# Patient Record
Sex: FEMALE | Race: WHITE | Employment: UNEMPLOYED | URBAN - METROPOLITAN AREA
[De-identification: names, ages, dates, MRNs, and addresses within clinical notes are randomized per-mention and may not be internally consistent; named-entity substitution may affect disease eponyms.]

---
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
## 2021-04-08 ENCOUNTER — RECORDS - HEALTHEAST (OUTPATIENT)
Dept: LAB | Facility: CLINIC | Age: 21
End: 2021-04-08

## 2021-04-08 LAB
FERRITIN SERPL-MCNC: 88 NG/ML (ref 6–40)
IRON SATN MFR SERPL: 23 % (ref 20–50)
IRON SERPL-MCNC: 65 UG/DL (ref 42–175)
T4 FREE SERPL-MCNC: 0.9 NG/DL (ref 0.7–1.8)
TIBC SERPL-MCNC: 287 UG/DL (ref 313–563)
TRANSFERRIN SERPL-MCNC: 229 MG/DL (ref 212–360)
TSH SERPL DL<=0.005 MIU/L-ACNC: 2.4 UIU/ML (ref 0.3–5)

## 2021-04-09 LAB — 25(OH)D3 SERPL-MCNC: 25.9 NG/ML (ref 30–80)

## 2021-08-27 ENCOUNTER — LAB REQUISITION (OUTPATIENT)
Dept: LAB | Facility: CLINIC | Age: 21
End: 2021-08-27
Payer: COMMERCIAL

## 2021-08-27 DIAGNOSIS — J02.9 ACUTE PHARYNGITIS, UNSPECIFIED: ICD-10-CM

## 2021-08-27 PROCEDURE — 86663 EPSTEIN-BARR ANTIBODY: CPT | Mod: ORL | Performed by: PEDIATRICS

## 2021-08-27 PROCEDURE — 86665 EPSTEIN-BARR CAPSID VCA: CPT | Mod: ORL | Performed by: PEDIATRICS

## 2021-08-27 PROCEDURE — 86645 CMV ANTIBODY IGM: CPT | Mod: ORL | Performed by: PEDIATRICS

## 2021-08-27 PROCEDURE — 86644 CMV ANTIBODY: CPT | Mod: ORL | Performed by: PEDIATRICS

## 2021-08-30 LAB
CMV IGG SERPL IA-ACNC: 5.5 U/ML
CMV IGG SERPL IA-ACNC: ABNORMAL
CMV IGM SERPL IA-ACNC: <8 AU/ML
CMV IGM SERPL IA-ACNC: NEGATIVE
EBV EA-D IGG SER-ACNC: 14.8 U/ML (ref 0–9)
EBV EA-D IGG SER-ACNC: POSITIVE
EBV NA IGG SER IA-ACNC: 34.6 U/ML
EBV NA IGG SER IA-ACNC: POSITIVE [IU]/ML
EBV VCA IGG SER IA-ACNC: 96.1 U/ML
EBV VCA IGG SER IA-ACNC: POSITIVE
EBV VCA IGM SER IA-ACNC: <10 U/ML
EBV VCA IGM SER IA-ACNC: NORMAL

## 2021-09-02 ENCOUNTER — LAB REQUISITION (OUTPATIENT)
Dept: LAB | Facility: CLINIC | Age: 21
End: 2021-09-02
Payer: COMMERCIAL

## 2021-09-02 DIAGNOSIS — Z86.19 PERSONAL HISTORY OF OTHER INFECTIOUS AND PARASITIC DISEASES: ICD-10-CM

## 2021-09-02 LAB
IGA SERPL-MCNC: 208 MG/DL (ref 65–400)
IGG SERPL-MCNC: 1375 MG/DL (ref 700–1700)
IGM SERPL-MCNC: 128 MG/DL (ref 60–280)

## 2021-09-02 PROCEDURE — 36415 COLL VENOUS BLD VENIPUNCTURE: CPT | Mod: ORL | Performed by: PEDIATRICS

## 2021-09-02 PROCEDURE — 86317 IMMUNOASSAY INFECTIOUS AGENT: CPT | Mod: ORL | Performed by: PEDIATRICS

## 2021-09-02 PROCEDURE — 82784 ASSAY IGA/IGD/IGG/IGM EACH: CPT | Mod: ORL | Performed by: PEDIATRICS

## 2021-09-05 LAB
C DIPHTHERIAE IGG SER-ACNC: 0.5 IU/ML
C TETANI TOXOID IGG SERPL IA-ACNC: 1.4 IU/ML
HAEM INFLU B IGG SER-MCNC: 0 UG/ML

## 2021-09-07 LAB
S PN DA SERO 19F IGG SER-MCNC: 29.2 MCG/ML
S PNEUM DA 1 IGG SER-MCNC: 32.9 MCG/ML
S PNEUM DA 10A IGG SER-MCNC: 12.5 MCG/ML
S PNEUM DA 11A IGG SER-MCNC: 1 MCG/ML
S PNEUM DA 12F IGG SER-MCNC: 0.8 MCG/ML
S PNEUM DA 14 IGG SER-MCNC: 11.1 MCG/ML
S PNEUM DA 15B IGG SER-MCNC: 12.3 MCG/ML
S PNEUM DA 17F IGG SER-MCNC: 12.2 MCG/ML
S PNEUM DA 18C IGG SER-MCNC: 0.9 MCG/ML
S PNEUM DA 19A IGG SER-MCNC: 5.2 MCG/ML
S PNEUM DA 2 IGG SER-MCNC: 3.8 MCG/ML
S PNEUM DA 20A IGG SER-MCNC: 3.4 MCG/ML
S PNEUM DA 22F IGG SER-MCNC: 91.8 MCG/ML
S PNEUM DA 23F IGG SER-MCNC: 131.9 MCG/ML
S PNEUM DA 3 IGG SER-MCNC: 2.9 MCG/ML
S PNEUM DA 33F IGG SER-MCNC: 1.9 MCG/ML
S PNEUM DA 4 IGG SER-MCNC: 0.9 MCG/ML
S PNEUM DA 5 IGG SER-MCNC: 3.6 MCG/ML
S PNEUM DA 6B IGG SER-MCNC: 12.6 MCG/ML
S PNEUM DA 7F IGG SER-MCNC: 10.5 MCG/ML
S PNEUM DA 8 IGG SER-MCNC: 3.8 MCG/ML
S PNEUM DA 9N IGG SER-MCNC: NORMAL MCG/ML
S PNEUM DA 9V IGG SER-MCNC: 6.8 MCG/ML

## 2021-10-26 ENCOUNTER — VIRTUAL VISIT (OUTPATIENT)
Dept: INFECTIOUS DISEASES | Facility: CLINIC | Age: 21
End: 2021-10-26
Attending: INTERNAL MEDICINE
Payer: COMMERCIAL

## 2021-10-26 ENCOUNTER — PRE VISIT (OUTPATIENT)
Dept: INFECTIOUS DISEASES | Facility: CLINIC | Age: 21
End: 2021-10-26

## 2021-10-26 DIAGNOSIS — B99.9 RECURRENT INFECTIONS: Primary | ICD-10-CM

## 2021-10-26 PROCEDURE — 99205 OFFICE O/P NEW HI 60 MIN: CPT | Mod: 95 | Performed by: INTERNAL MEDICINE

## 2021-10-26 RX ORDER — ONDANSETRON 4 MG/1
TABLET, ORALLY DISINTEGRATING ORAL
COMMUNITY
Start: 2021-10-14

## 2021-10-26 RX ORDER — ESCITALOPRAM OXALATE 20 MG/1
20 TABLET ORAL
COMMUNITY

## 2021-10-26 RX ORDER — CYCLOSPORINE 0.5 MG/ML
EMULSION OPHTHALMIC
COMMUNITY
Start: 2021-08-18

## 2021-10-26 RX ORDER — ALBUTEROL SULFATE 90 UG/1
AEROSOL, METERED RESPIRATORY (INHALATION)
COMMUNITY

## 2021-10-26 RX ORDER — INHALER, ASSIST DEVICES
SPACER (EA) MISCELLANEOUS
COMMUNITY
Start: 2021-05-13

## 2021-10-26 RX ORDER — FLUTICASONE PROPIONATE 50 MCG
2 SPRAY, SUSPENSION (ML) NASAL
COMMUNITY
Start: 2021-09-30

## 2021-10-26 ASSESSMENT — PAIN SCALES - GENERAL: PAINLEVEL: NO PAIN (0)

## 2021-10-26 NOTE — LETTER
10/26/2021       RE: Kerline Young  [address] [place] [address]     Dear Colleague,    Thank you for referring your patient, Kerline Young, to the [facility] INFECTIOUS DISEASE CLINIC [facility]. Please see a copy of my visit note below.      Kerline Young is a 20 year old woman who is being evaluated via a billable video visit.      ** patient can do Doximity **    How would you like to obtain your AVS? MyChart  If the video visit is dropped, the invitation should be resent by: Text to cell phone: [phone]  Will anyone else be joining your video visit? No  {If patient encounters technical issues they should call [phone] :[phone]}    Reason for visit:   Infectious Disease New Visit, self referred for recurrent infections    HISTORY OF THE PRESENT ILLNESS:    Kerline Young is a 21 yo woman with mild asthma using albuterol PRN and history of Hashimoto's thyroiditis, without ongoing need for thyroid hormone replacement.    Kerline is seen today to review frequent or recurrent infections she has experienced in the last year.    Kerline had COVID in 10/2020.   She had symptoms of fever, sore throat, loss of taste and smell, fatigue.  She vomited, which she nearly always does when she is not feeling well regardless.  Cough developed a few days into the course.  She was able to do OK at home, without needing additional care.  She was feeling much better within 10-14 days, with fatigue being the last thing to resolve.  She is confident that she was back to her baseline by Thanksgiving 2020, and likely before.      She then felt well until Spring of 2021.  She had sinus pressure and was diagnosed with her first ever sinus infection in May 2021.  Then bacterial tonsillitis.  She recalls having a prescription for doxycycline first, then Augmentin.  By June, she had severe dry eye that she need eye drops to treat.  Lately she has not needed eye drops  "and feels the eyes are fine.    In late August 2021, she had sore and swollen throat. She also had new fatigue at that time. 8/26 Group A Strep PCR not detected.  8/27 and 8/28/2021 she was evaluated with CMV and EBV testing. CMV IgM negative, CMV IgG positive, EBV IgM negative, EBV IgGs positive including EBNA.  8/28 CT of the Neck with marked enlargement of lymphoid tissue of Waldeyer's ring and pharyngitis.  She received Decadron and Augmentin, and presumed mononucleosis diagnosis.    9/30/2021 Returned to care with cough, sore throat, nasal congestion.  Was given sinus rinses, intranasal corticosteroid, decongestant.    10/13/2021  Kerline reported recurrent nasal congestion and not feeling well to PCP, and she prescribed Augmentin for 21 days    10/14/2021  Kerline vomited 13 times in 3 hours, so went to the ED.  WBC 21.  ANC 19.   CMP all OK other than very mild elevation of AST to 47 (normal up to 38)  Given anti-emetics and fluids and felt better.  Resumed Augmentin, and this has really helped.  She is now feeling quite a bit better as of today 10/26/2021.      I have reviewed and updated the patient's Past Medical History, Social History, Family History and Medication List.    PMH:    February 2020 diagnosed with pneumonia and felt very sick.  Did not require hospitalization.  Would also get Strep diagnoses a lot as a kid.  Whenever she would get a fever, she would also vomit.  Mother would not say that she had frequent infections outside of what would be expected for a normal childhood.    Family Med Hx:  Brother with history of small and medium cell vasculitis  5 of her 5 paternal aunts have hypothyroidism  Father had \"iritis\" one time that they never identified an etiology      OBJECTIVE:    Current Outpatient Medications   Medication     albuterol (PROAIR HFA/PROVENTIL HFA/VENTOLIN HFA) 108 (90 Base) MCG/ACT inhaler     amoxicillin-clavulanate (AUGMENTIN) 875-125 MG tablet     cycloSPORINE (RESTASIS) 0.05 % " ophthalmic emulsion     escitalopram (LEXAPRO) 20 MG tablet     fluticasone (FLONASE) 50 MCG/ACT nasal spray     levonorgestrel (MIRENA) 20 MCG/24HR IUD     ondansetron (ZOFRAN-ODT) 4 MG ODT tab     Spacer/Aero-Holding Chambers (AEROCHAMBER MV) [facility]     No current facility-administered medications for this visit.       No Known Allergies    Exam via video visit:     GENERAL: Patient appears well and is not in any acute distress    HEENT: The eyes do not appear to have any icterus or injection.  EOM appear intact.  RESPIRATORY:  No cough or labored breathing is noted.  SKIN:  No rashes are visible  NEURO:  Awake, alert, no focal neurologic deficits are noted.  PSYCH: Affect is bright. Speech fluent and appropriate.      The rest of a comprehensive physical examination is deferred due to the public [facility] emergency video visit restrictions.      Labs and imaging as described in the HPI  Reviewed > 3 lab results  Reviewed > 3 notes  Reviewed one CT    9/2/2021  Normal range IgG, IgM, IgA   No antibody to HiB  Diphtheria and Tetanus with protective range antibody levels  Strep pneumoniae antibody panel with protective range antibody levels, and very high to 22F/23F suggesting prior infection      ASSESSMENT:    Frequent or recurrent infections in the last year    Had return to baseline after COVID 10/2020, and then recurrent sinus symptoms since Spring 2021, along with various upper respiratory ailments ever since.  The mononucleosis type symptoms did not start until this late summer 2021.  Fatigue is still a concern.  Last thyroid levels were checked 4/2021 and were within normal ranges.    Normal immunoglobulins and response to vaccines evident.  I am not too concerned that she does not have protective antibody to HiB.  Many of us wouldn't as immunity wanes and vaccines are given for HiB in early childhood only.   Has not had B and T cell subsets checked.  Does not have childhood history concerning for primary  immunodeficiency.    Kerline and her mother wonder if she could have ongoing EBV, which I doubt, but could be checked with convalescent titers and EBV PCR.  I wonder if COVID infection left her more susceptible this past year, coupled with her transition to college.  She may have picked up a resistant bacteria that is infecting her sinuses, tonsils, and/or adenoids and the Augmentin is enough to keep it at bay, but infection recurs.        RECOMMENDATIONS:    - complete the course of Augmentin as prescribed for the full 21 days  10/13 - 11/2     - monitor for symptoms to return, and then seek a sinus bacterial culture OFF antibiotics    - will consider labs for EBV antibody titers, EBV PCR, B and T cell subsets, repeat IgG/IgM/IgA and check IgE    - virtual visit with me on 11/9 to follow-up OFF antibiotics        Video-Visit Details    Type of service:  Video Visit    Video Start Time: 5:00 PM  Video End Time:  5:40 PM    Originating Location (pt. Location): Home    Distant Location (provider location):  [facility] INFECTIOUS DISEASE CLINIC [place]     Platform used for Video Visit: [employer]     Otf Daigle MD, MS  Infectious Disease    Time:  A total of 60 minutes was spent in care of the patient today. 40 minutes were spent on the video, with an additional 20 minutes spent on chart review and care coordination.        Again, thank you for allowing me to participate in the care of your patient.      Sincerely,    Otf Daigle MD

## 2021-10-26 NOTE — PROGRESS NOTES
Kerline Young is a 20 year old woman who is being evaluated via a billable video visit.      ** patient can do Doximity **    How would you like to obtain your AVS? MyChart  If the video visit is dropped, the invitation should be resent by: Text to cell phone: [phone]  Will anyone else be joining your video visit? No      Reason for visit:   Infectious Disease New Visit, self referred for recurrent infections    HISTORY OF THE PRESENT ILLNESS:    Kerline Young is a 21 yo woman with mild asthma using albuterol PRN and history of Hashimoto's thyroiditis, without ongoing need for thyroid hormone replacement.    Kerline is seen today to review frequent or recurrent infections she has experienced in the last year.    Kerline had COVID in 10/2020.   She had symptoms of fever, sore throat, loss of taste and smell, fatigue.  She vomited, which she nearly always does when she is not feeling well regardless.  Cough developed a few days into the course.  She was able to do OK at home, without needing additional care.  She was feeling much better within 10-14 days, with fatigue being the last thing to resolve.  She is confident that she was back to her baseline by Thanksgiving 2020, and likely before.      She then felt well until Spring of 2021.  She had sinus pressure and was diagnosed with her first ever sinus infection in May 2021.  Then bacterial tonsillitis.  She recalls having a prescription for doxycycline first, then Augmentin.  By June, she had severe dry eye that she need eye drops to treat.  Lately she has not needed eye drops and feels the eyes are fine.    In late August 2021, she had sore and swollen throat. She also had new fatigue at that time. 8/26 Group A Strep PCR not detected.  8/27 and 8/28/2021 she was evaluated with CMV and EBV testing. CMV IgM negative, CMV IgG positive, EBV IgM negative, EBV IgGs positive including EBNA.  8/28 CT of the Neck with marked enlargement of lymphoid tissue of Waldeyer's ring  "and pharyngitis.  She received Decadron and Augmentin, and presumed mononucleosis diagnosis.    9/30/2021 Returned to care with cough, sore throat, nasal congestion.  Was given sinus rinses, intranasal corticosteroid, decongestant.    10/13/2021  Kerline reported recurrent nasal congestion and not feeling well to PCP, and she prescribed Augmentin for 21 days    10/14/2021  Kerline vomited 13 times in 3 hours, so went to the ED.  WBC 21.  ANC 19.   CMP all OK other than very mild elevation of AST to 47 (normal up to 38)  Given anti-emetics and fluids and felt better.  Resumed Augmentin, and this has really helped.  She is now feeling quite a bit better as of today 10/26/2021.      I have reviewed and updated the patient's Past Medical History, Social History, Family History and Medication List.    PMH:    February 2020 diagnosed with pneumonia and felt very sick.  Did not require hospitalization.  Would also get Strep diagnoses a lot as a kid.  Whenever she would get a fever, she would also vomit.  Mother would not say that she had frequent infections outside of what would be expected for a normal childhood.    Family Med Hx:  Brother with history of small and medium cell vasculitis  5 of her 5 paternal aunts have hypothyroidism  Father had \"iritis\" one time that they never identified an etiology      OBJECTIVE:    Current Outpatient Medications   Medication     albuterol (PROAIR HFA/PROVENTIL HFA/VENTOLIN HFA) 108 (90 Base) MCG/ACT inhaler     amoxicillin-clavulanate (AUGMENTIN) 875-125 MG tablet     cycloSPORINE (RESTASIS) 0.05 % ophthalmic emulsion     escitalopram (LEXAPRO) 20 MG tablet     fluticasone (FLONASE) 50 MCG/ACT nasal spray     levonorgestrel (MIRENA) 20 MCG/24HR IUD     ondansetron (ZOFRAN-ODT) 4 MG ODT tab     Spacer/Aero-Holding Chambers (AEROCHAMBER MV) [facility] [place] [place]     No current facility-administered medications for this visit.       No Known Allergies    Exam via video visit:     GENERAL: Patient appears well " and is not in any acute distress    HEENT: The eyes do not appear to have any icterus or injection.  EOM appear intact.  RESPIRATORY:  No cough or labored breathing is noted.  SKIN:  No rashes are visible  NEURO:  Awake, alert, no focal neurologic deficits are noted.  PSYCH: Affect is bright. Speech fluent and appropriate.      The rest of a comprehensive physical examination is deferred due to the public health emergency video visit restrictions.      Labs and imaging as described in the HPI  Reviewed > 3 lab results  Reviewed > 3 notes  Reviewed one CT    9/2/2021  Normal range IgG, IgM, IgA   No antibody to HiB  Diphtheria and Tetanus with protective range antibody levels  Strep pneumoniae antibody panel with protective range antibody levels, and very high to 22F/23F suggesting prior infection      ASSESSMENT:    Frequent or recurrent infections in the last year    Had return to baseline after COVID 10/2020, and then recurrent sinus symptoms since Spring 2021, along with various upper respiratory ailments ever since.  The mononucleosis type symptoms did not start until this late summer 2021.  Fatigue is still a concern.  Last thyroid levels were checked 4/2021 and were within normal ranges.    Normal immunoglobulins and response to vaccines evident.  I am not too concerned that she does not have protective antibody to HiB.  Many of us wouldn't as immunity wanes and vaccines are given for HiB in early childhood only.   Has not had B and T cell subsets checked.  Does not have childhood history concerning for primary immunodeficiency.    Kerline and her mother wonder if she could have ongoing EBV, which I doubt, but could be checked with convalescent titers and EBV PCR.  I wonder if COVID infection left her more susceptible this past year, coupled with her transition to college.  She may have picked up a resistant bacteria that is infecting her sinuses, tonsils, and/or adenoids and the Augmentin is enough to keep it at  bay, but infection recurs.        RECOMMENDATIONS:    - complete the course of Augmentin as prescribed for the full 21 days  10/13 - 11/2     - monitor for symptoms to return, and then seek a sinus bacterial culture OFF antibiotics    - will consider labs for EBV antibody titers, EBV PCR, B and T cell subsets, repeat IgG/IgM/IgA and check IgE    - virtual visit with me on 11/9 to follow-up OFF antibiotics        Video-Visit Details    Type of service:  Video Visit    Video Start Time: 5:00 PM  Video End Time:  5:40 PM    Originating Location (pt. Location): Home    Distant Location (provider location):  [facility] INFECTIOUS DISEASE CLINIC [place]     Platform used for Video Visit: Radha Daigle MD, MS  Infectious Disease    Time:  A total of 60 minutes was spent in care of the patient today. 40 minutes were spent on the video, with an additional 20 minutes spent on chart review and care coordination.

## 2021-10-26 NOTE — LETTER
Date:December 2, 2021      Patient was self referred, no letter generated. Do not send.        [facility] Health Information

## 2021-10-26 NOTE — TELEPHONE ENCOUNTER
RECORDS RECEIVED FROM:    DATE RECEIVED: 10.26.2021   NOTES (Gather within 2 years) STATUS DETAILS   OFFICE NOTE from referring provider   N/A    OFFICE NOTE from other specialist Internal 08.26.2021  [facility]     DISCHARGE SUMMARY from hospital N/A    DISCHARGE REPORT from the ER Internal 08.28.2021    LABS (any labs) Care Everywhere / Internal    MEDICATION LIST Care Everywhere    IMAGING  (NEED IMAGES AND REPORTS)     Osteomyelitis: Foot imaging  N/A    Liver Abscess: Abdominal imaging N/A    Other (anything related to diagnoses N/A

## 2021-12-16 ENCOUNTER — LAB REQUISITION (OUTPATIENT)
Dept: LAB | Facility: CLINIC | Age: 21
End: 2021-12-16
Payer: COMMERCIAL

## 2021-12-16 DIAGNOSIS — E06.3 AUTOIMMUNE THYROIDITIS: ICD-10-CM

## 2021-12-16 LAB
T4 FREE SERPL-MCNC: 0.91 NG/DL (ref 0.7–1.8)
TSH SERPL DL<=0.005 MIU/L-ACNC: 1.95 UIU/ML (ref 0.3–5)

## 2021-12-16 PROCEDURE — 84439 ASSAY OF FREE THYROXINE: CPT | Mod: ORL | Performed by: PEDIATRICS

## 2021-12-16 PROCEDURE — 84443 ASSAY THYROID STIM HORMONE: CPT | Mod: ORL | Performed by: PEDIATRICS

## 2021-12-16 PROCEDURE — 82306 VITAMIN D 25 HYDROXY: CPT | Mod: ORL | Performed by: PEDIATRICS

## 2021-12-16 PROCEDURE — 83516 IMMUNOASSAY NONANTIBODY: CPT | Mod: ORL | Performed by: PEDIATRICS

## 2021-12-17 LAB — DEPRECATED CALCIDIOL+CALCIFEROL SERPL-MC: 26 UG/L (ref 30–80)

## 2021-12-21 LAB
TTG IGA SER-ACNC: 0.7 U/ML
TTG IGG SER-ACNC: 0.9 U/ML

## 2022-01-03 ENCOUNTER — LAB REQUISITION (OUTPATIENT)
Dept: LAB | Facility: CLINIC | Age: 22
End: 2022-01-03
Payer: COMMERCIAL

## 2022-01-03 DIAGNOSIS — J35.02 CHRONIC ADENOIDITIS: ICD-10-CM

## 2022-01-03 PROCEDURE — 87070 CULTURE OTHR SPECIMN AEROBIC: CPT | Mod: ORL | Performed by: PEDIATRICS

## 2022-01-05 LAB — BACTERIA SKIN AEROBE CULT: NORMAL

## 2022-09-19 ENCOUNTER — LAB REQUISITION (OUTPATIENT)
Dept: LAB | Facility: CLINIC | Age: 22
End: 2022-09-19
Payer: COMMERCIAL

## 2022-09-19 DIAGNOSIS — E06.3 AUTOIMMUNE THYROIDITIS: ICD-10-CM

## 2022-09-19 LAB — T4 FREE SERPL-MCNC: 0.18 NG/DL (ref 0.9–1.7)

## 2022-09-19 PROCEDURE — 84443 ASSAY THYROID STIM HORMONE: CPT | Mod: ORL | Performed by: PEDIATRICS

## 2022-09-19 PROCEDURE — 84439 ASSAY OF FREE THYROXINE: CPT | Mod: ORL | Performed by: PEDIATRICS

## 2022-09-20 LAB — TSH SERPL DL<=0.005 MIU/L-ACNC: 218 UIU/ML (ref 0.3–4.2)
